# Patient Record
Sex: MALE | Race: WHITE | Employment: FULL TIME | URBAN - METROPOLITAN AREA
[De-identification: names, ages, dates, MRNs, and addresses within clinical notes are randomized per-mention and may not be internally consistent; named-entity substitution may affect disease eponyms.]

---

## 2020-03-02 ENCOUNTER — HOSPITAL ENCOUNTER (OUTPATIENT)
Facility: CLINIC | Age: 55
Setting detail: OBSERVATION
Discharge: HOME | End: 2020-03-03
Attending: FAMILY MEDICINE | Admitting: FAMILY MEDICINE
Payer: COMMERCIAL

## 2020-03-02 ENCOUNTER — APPOINTMENT (OUTPATIENT)
Dept: GENERAL RADIOLOGY | Facility: CLINIC | Age: 55
End: 2020-03-02
Attending: FAMILY MEDICINE
Payer: COMMERCIAL

## 2020-03-02 DIAGNOSIS — R06.02 SHORTNESS OF BREATH: ICD-10-CM

## 2020-03-02 DIAGNOSIS — R07.9 CHEST PAIN, UNSPECIFIED TYPE: ICD-10-CM

## 2020-03-02 LAB
ALBUMIN SERPL-MCNC: 4.5 G/DL (ref 3.4–5)
ALP SERPL-CCNC: 90 U/L (ref 40–150)
ALT SERPL W P-5'-P-CCNC: 56 U/L (ref 0–70)
ANION GAP SERPL CALCULATED.3IONS-SCNC: 7 MMOL/L (ref 3–14)
AST SERPL W P-5'-P-CCNC: 29 U/L (ref 0–45)
BASOPHILS # BLD AUTO: 0 10E9/L (ref 0–0.2)
BASOPHILS NFR BLD AUTO: 0.3 %
BILIRUB SERPL-MCNC: 0.8 MG/DL (ref 0.2–1.3)
BUN SERPL-MCNC: 17 MG/DL (ref 7–30)
CALCIUM SERPL-MCNC: 9.1 MG/DL (ref 8.5–10.1)
CHLORIDE SERPL-SCNC: 103 MMOL/L (ref 94–109)
CO2 SERPL-SCNC: 28 MMOL/L (ref 20–32)
CREAT SERPL-MCNC: 0.92 MG/DL (ref 0.66–1.25)
D DIMER PPP FEU-MCNC: <0.3 UG/ML FEU (ref 0–0.5)
DIFFERENTIAL METHOD BLD: NORMAL
EOSINOPHIL # BLD AUTO: 0.1 10E9/L (ref 0–0.7)
EOSINOPHIL NFR BLD AUTO: 0.8 %
ERYTHROCYTE [DISTWIDTH] IN BLOOD BY AUTOMATED COUNT: 12.6 % (ref 10–15)
GFR SERPL CREATININE-BSD FRML MDRD: >90 ML/MIN/{1.73_M2}
GLUCOSE SERPL-MCNC: 99 MG/DL (ref 70–99)
HCT VFR BLD AUTO: 45.5 % (ref 40–53)
HGB BLD-MCNC: 16 G/DL (ref 13.3–17.7)
IMM GRANULOCYTES # BLD: 0 10E9/L (ref 0–0.4)
IMM GRANULOCYTES NFR BLD: 0.6 %
LYMPHOCYTES # BLD AUTO: 1 10E9/L (ref 0.8–5.3)
LYMPHOCYTES NFR BLD AUTO: 14 %
MCH RBC QN AUTO: 31.2 PG (ref 26.5–33)
MCHC RBC AUTO-ENTMCNC: 35.2 G/DL (ref 31.5–36.5)
MCV RBC AUTO: 89 FL (ref 78–100)
MONOCYTES # BLD AUTO: 0.5 10E9/L (ref 0–1.3)
MONOCYTES NFR BLD AUTO: 6.6 %
NEUTROPHILS # BLD AUTO: 5.6 10E9/L (ref 1.6–8.3)
NEUTROPHILS NFR BLD AUTO: 77.7 %
NRBC # BLD AUTO: 0 10*3/UL
NRBC BLD AUTO-RTO: 0 /100
NT-PROBNP SERPL-MCNC: 13 PG/ML (ref 0–900)
PLATELET # BLD AUTO: 202 10E9/L (ref 150–450)
POTASSIUM SERPL-SCNC: 3.6 MMOL/L (ref 3.4–5.3)
PROT SERPL-MCNC: 8.1 G/DL (ref 6.8–8.8)
RBC # BLD AUTO: 5.13 10E12/L (ref 4.4–5.9)
SODIUM SERPL-SCNC: 138 MMOL/L (ref 133–144)
TROPONIN I SERPL-MCNC: <0.015 UG/L (ref 0–0.04)
WBC # BLD AUTO: 7.2 10E9/L (ref 4–11)

## 2020-03-02 PROCEDURE — 85025 COMPLETE CBC W/AUTO DIFF WBC: CPT | Performed by: FAMILY MEDICINE

## 2020-03-02 PROCEDURE — 99220 ZZC INITIAL OBSERVATION CARE,LEVL III: CPT | Mod: Z6 | Performed by: NURSE PRACTITIONER

## 2020-03-02 PROCEDURE — 80053 COMPREHEN METABOLIC PANEL: CPT | Performed by: FAMILY MEDICINE

## 2020-03-02 PROCEDURE — 85379 FIBRIN DEGRADATION QUANT: CPT | Performed by: FAMILY MEDICINE

## 2020-03-02 PROCEDURE — 93005 ELECTROCARDIOGRAM TRACING: CPT | Mod: 76 | Performed by: FAMILY MEDICINE

## 2020-03-02 PROCEDURE — 84484 ASSAY OF TROPONIN QUANT: CPT | Performed by: FAMILY MEDICINE

## 2020-03-02 PROCEDURE — 83880 ASSAY OF NATRIURETIC PEPTIDE: CPT | Performed by: FAMILY MEDICINE

## 2020-03-02 PROCEDURE — 71045 X-RAY EXAM CHEST 1 VIEW: CPT

## 2020-03-02 PROCEDURE — 93005 ELECTROCARDIOGRAM TRACING: CPT | Performed by: FAMILY MEDICINE

## 2020-03-02 PROCEDURE — 25000132 ZZH RX MED GY IP 250 OP 250 PS 637: Performed by: FAMILY MEDICINE

## 2020-03-02 PROCEDURE — 93010 ELECTROCARDIOGRAM REPORT: CPT | Mod: Z6 | Performed by: FAMILY MEDICINE

## 2020-03-02 PROCEDURE — 99285 EMERGENCY DEPT VISIT HI MDM: CPT | Mod: 25 | Performed by: FAMILY MEDICINE

## 2020-03-02 RX ORDER — ALUMINA, MAGNESIA, AND SIMETHICONE 2400; 2400; 240 MG/30ML; MG/30ML; MG/30ML
30 SUSPENSION ORAL EVERY 4 HOURS PRN
Status: DISCONTINUED | OUTPATIENT
Start: 2020-03-02 | End: 2020-03-03 | Stop reason: HOSPADM

## 2020-03-02 RX ORDER — MULTIVIT-MIN/IRON/FOLIC ACID/K 18-600-40
1 CAPSULE ORAL DAILY
COMMUNITY

## 2020-03-02 RX ORDER — LISINOPRIL 10 MG/1
10 TABLET ORAL DAILY
COMMUNITY

## 2020-03-02 RX ORDER — NITROGLYCERIN 0.4 MG/1
0.4 TABLET SUBLINGUAL EVERY 5 MIN PRN
Status: DISCONTINUED | OUTPATIENT
Start: 2020-03-02 | End: 2020-03-03 | Stop reason: HOSPADM

## 2020-03-02 RX ORDER — ASPIRIN 81 MG/1
81 TABLET, CHEWABLE ORAL DAILY
Status: DISCONTINUED | OUTPATIENT
Start: 2020-03-03 | End: 2020-03-03 | Stop reason: HOSPADM

## 2020-03-02 RX ORDER — ACETAMINOPHEN 650 MG/1
650 SUPPOSITORY RECTAL EVERY 4 HOURS PRN
Status: DISCONTINUED | OUTPATIENT
Start: 2020-03-02 | End: 2020-03-03 | Stop reason: HOSPADM

## 2020-03-02 RX ORDER — ACETAMINOPHEN 325 MG/1
650 TABLET ORAL EVERY 4 HOURS PRN
Status: DISCONTINUED | OUTPATIENT
Start: 2020-03-02 | End: 2020-03-03 | Stop reason: HOSPADM

## 2020-03-02 RX ORDER — NALOXONE HYDROCHLORIDE 0.4 MG/ML
.1-.4 INJECTION, SOLUTION INTRAMUSCULAR; INTRAVENOUS; SUBCUTANEOUS
Status: DISCONTINUED | OUTPATIENT
Start: 2020-03-02 | End: 2020-03-03 | Stop reason: HOSPADM

## 2020-03-02 RX ORDER — ASPIRIN 81 MG/1
324 TABLET, CHEWABLE ORAL ONCE
Status: COMPLETED | OUTPATIENT
Start: 2020-03-02 | End: 2020-03-02

## 2020-03-02 RX ORDER — CHLORTHALIDONE 25 MG/1
25 TABLET ORAL DAILY
COMMUNITY

## 2020-03-02 RX ORDER — ASPIRIN 81 MG/1
162 TABLET, CHEWABLE ORAL ONCE
Status: DISCONTINUED | OUTPATIENT
Start: 2020-03-03 | End: 2020-03-03 | Stop reason: HOSPADM

## 2020-03-02 RX ORDER — FEXOFENADINE HCL 180 MG/1
180 TABLET ORAL DAILY PRN
COMMUNITY

## 2020-03-02 RX ORDER — LIDOCAINE 40 MG/G
CREAM TOPICAL
Status: DISCONTINUED | OUTPATIENT
Start: 2020-03-02 | End: 2020-03-03 | Stop reason: HOSPADM

## 2020-03-02 RX ADMIN — ASPIRIN 81 MG CHEWABLE TABLET 324 MG: 81 TABLET CHEWABLE at 20:58

## 2020-03-03 ENCOUNTER — APPOINTMENT (OUTPATIENT)
Dept: CARDIOLOGY | Facility: CLINIC | Age: 55
End: 2020-03-03
Attending: NURSE PRACTITIONER
Payer: COMMERCIAL

## 2020-03-03 VITALS
TEMPERATURE: 97.8 F | RESPIRATION RATE: 16 BRPM | WEIGHT: 196 LBS | DIASTOLIC BLOOD PRESSURE: 88 MMHG | HEART RATE: 68 BPM | OXYGEN SATURATION: 98 % | SYSTOLIC BLOOD PRESSURE: 144 MMHG

## 2020-03-03 LAB
INTERPRETATION ECG - MUSE: NORMAL
TROPONIN I SERPL-MCNC: <0.015 UG/L (ref 0–0.04)
TROPONIN I SERPL-MCNC: <0.015 UG/L (ref 0–0.04)

## 2020-03-03 PROCEDURE — 93017 CV STRESS TEST TRACING ONLY: CPT

## 2020-03-03 PROCEDURE — 93010 ELECTROCARDIOGRAM REPORT: CPT | Performed by: INTERNAL MEDICINE

## 2020-03-03 PROCEDURE — 99217 ZZC OBSERVATION CARE DISCHARGE: CPT | Mod: Z6 | Performed by: FAMILY MEDICINE

## 2020-03-03 PROCEDURE — 93321 DOPPLER ECHO F-UP/LMTD STD: CPT | Mod: 26 | Performed by: INTERNAL MEDICINE

## 2020-03-03 PROCEDURE — G0378 HOSPITAL OBSERVATION PER HR: HCPCS

## 2020-03-03 PROCEDURE — 36415 COLL VENOUS BLD VENIPUNCTURE: CPT | Performed by: NURSE PRACTITIONER

## 2020-03-03 PROCEDURE — 93350 STRESS TTE ONLY: CPT | Mod: 26 | Performed by: INTERNAL MEDICINE

## 2020-03-03 PROCEDURE — 25000132 ZZH RX MED GY IP 250 OP 250 PS 637: Performed by: NURSE PRACTITIONER

## 2020-03-03 PROCEDURE — 93018 CV STRESS TEST I&R ONLY: CPT | Mod: GC | Performed by: INTERNAL MEDICINE

## 2020-03-03 PROCEDURE — 84484 ASSAY OF TROPONIN QUANT: CPT | Mod: 91 | Performed by: NURSE PRACTITIONER

## 2020-03-03 PROCEDURE — 25500064 ZZH RX 255 OP 636: Performed by: INTERNAL MEDICINE

## 2020-03-03 PROCEDURE — 40000065 ZZH STATISTIC EKG NON-CHARGEABLE

## 2020-03-03 PROCEDURE — 93325 DOPPLER ECHO COLOR FLOW MAPG: CPT | Mod: 26 | Performed by: INTERNAL MEDICINE

## 2020-03-03 PROCEDURE — 93005 ELECTROCARDIOGRAM TRACING: CPT

## 2020-03-03 PROCEDURE — 93016 CV STRESS TEST SUPVJ ONLY: CPT | Mod: GC | Performed by: INTERNAL MEDICINE

## 2020-03-03 RX ORDER — HYDRALAZINE HYDROCHLORIDE 20 MG/ML
5 INJECTION INTRAMUSCULAR; INTRAVENOUS EVERY 4 HOURS PRN
Status: DISCONTINUED | OUTPATIENT
Start: 2020-03-03 | End: 2020-03-03 | Stop reason: HOSPADM

## 2020-03-03 RX ORDER — LISINOPRIL 10 MG/1
10 TABLET ORAL DAILY
Status: DISCONTINUED | OUTPATIENT
Start: 2020-03-03 | End: 2020-03-03 | Stop reason: HOSPADM

## 2020-03-03 RX ADMIN — PERFLUTREN 5 ML: 6.52 INJECTION, SUSPENSION INTRAVENOUS at 10:32

## 2020-03-03 RX ADMIN — ASPIRIN 81 MG CHEWABLE TABLET 81 MG: 81 TABLET CHEWABLE at 08:08

## 2020-03-03 RX ADMIN — LISINOPRIL 10 MG: 10 TABLET ORAL at 08:08

## 2020-03-03 NOTE — H&P
"[facility], [place]    History and Physical - ED Observation       Date of Admission:  3/2/2020    Assessment & Plan   Khanh Farah is a 55 year old male with a past medical history of hypertension who presents to the Emergency Department for evaluation of chest pain, and shortness of breath.     #Chest pain  #Shortness of breath  Patient states he initially felt this chest pain yesterday when he was walking around the grocery store.  Patient states this resolved on its own, and went to the rest of the day without noticing anything abnormal.  Patient states he walked 3-4 blocks, and then sat down on the bus today when he noticed a \"sharp\" chest pain in the left side of his chest, with associated shortness of breath and diaphoresis. In the ED, /100, HR 67, temp 96.5, RR 16, SPO2 98% on RA. Labs show Na 138, K+ 3.6, Cr 0.92, BUN 17. BNP 13. Troponin negative. D-Dimer 0.3. CBC unremarkable. EKG stable. Chest xray shows no acute cardiopulmonary process. Cardiac risk factors include HTN, HLD, and family history. Medications: ASA 162mg in ED. Plan: Admit to ED Observation for ACS Rule out.   -Mullens to ED Obs  -Continuous cardiac monitoring  -Serial troponins  -Exercise stress echo   -Hydralazine PRN  -Continue PTA lisinopril     Diet: NPO for Medical/Clinical Reasons Except for: Meds    DVT Prophylaxis: Low Risk/Ambulatory with no VTE prophylaxis indicated  Butcher Catheter: not present  Code Status: full    Disposition Plan   Expected discharge: Tomorrow, recommended to prior living arrangement once cardiac workup complete.  Entered: Annette Isaac CNP 03/02/2020, 10:47 PM       Annette Isaac CNP  [facility], [place]  ED Observation, 6D  [id number]:[id number]  ______________________________________________________________________    Chief Complaint   Chest pain    History is obtained from the patient    History of Present Illness   Per ED,\"Khanh Farah is " "a 55 year old male with a past medical history of hypertension who presents to the Emergency Department for evaluation of chest pain, and shortness of breath.  Patient states he initially felt this chest pain yesterday when he was walking around the grocery store.  Patient states this resolved on its own, and went to the rest of the day without noticing anything abnormal.  Patient states he walked 3-4 blocks, and then sat down on the bus today when he noticed a \"sharp\" chest pain in the left side of his chest, with associated shortness of breath.  He states this was way worse than the episode he had yesterday.  Patient states he \"felt a little off\" prior to this chest pain and shortness of breath.  Patient states he feels he is unable to get a full breath at this time.  Patient currently denies having this particular pain, but states that it comes and goes.  Patient states that these pains usually last 15 minutes.  Patient states that he has never experienced chest pain like this before, and that his blood pressure is usually not in the 160s systolic.  Patient denies any history of diabetes, tobacco use, or hyperlipidemia.  Patient denies any history of heart problems.  Patient states he feels \"tense\".  Patient denies any cancer history, or leg swelling at this time.  Patient states his father did have a aortic stenosis.\"    Review of Systems    ROS: 14 point ROS neg other than the symptoms noted above in the HPI.    Past Medical History    I have reviewed this patient's medical history and updated it with pertinent information if needed.   History reviewed. No pertinent past medical history.    Past Surgical History   I have reviewed this patient's surgical history and updated it with pertinent information if needed.  History reviewed. No pertinent surgical history.    Social History   I have reviewed this patient's social history and updated it with pertinent information if needed.  Social History     Tobacco Use "     Smoking status: None   Substance Use Topics     Alcohol use: None     Drug use: None       Family History   I have reviewed this patient's family history and updated it with pertinent information if needed.   No family history on file.    Prior to Admission Medications   Prior to Admission Medications   Prescriptions Last Dose Informant Patient Reported? Taking?   Vitamin D, Cholecalciferol, 25 MCG (1000 UT) TABS   Yes Yes   Sig: Take 1 tablet by mouth daily   chlorthalidone (HYGROTON) 25 MG tablet   Yes Yes   Sig: Take 25 mg by mouth daily   fexofenadine (ALLEGRA) 180 MG tablet   Yes Yes   Sig: Take 180 mg by mouth daily as needed for allergies   lisinopril (ZESTRIL) 10 MG tablet   Yes Yes   Sig: Take 10 mg by mouth daily      Facility-Administered Medications: None     Allergies   No Known Allergies    Physical Exam   Vital Signs: Temp: 96.5  F (35.8  C) Temp src: Oral BP: (!) 136/97 Pulse: 67 Heart Rate: 59 Resp: 8 SpO2: 98 % O2 Device: None (Room air)    Weight: 196 lbs 3 oz    Constitutional: awake, alert, cooperative, no apparent distress, and appears stated age  Eyes: Lids and lashes normal, pupils equal, round and reactive to light, extra ocular muscles intact, sclera clear, conjunctiva normal  ENT: Normocephalic, atraumatic, external ears without lesions, oral pharynx with moist mucous membranes.  Respiratory: Clear to auscultation bilaterally, no crackles or wheezing  Cardiovascular: Regular rate and rhythm, normal S1 and S2, no S3 or S4, and no murmur noted  Abdomen: Normal bowel sounds, soft, non-distended  Skin: normal skin color, texture, turgor  Musculoskeletal: There is no redness, warmth, or swelling of the joints.  Full range of motion noted.  Motor strength is 5 out of 5 all extremities bilaterally.  Tone is normal.  Neurologic: Awake, alert, oriented to name, place and time.  Cranial nerves II-XII are grossly intact.  Motor is 5 out of 5 bilaterally. Gait is normal.    Data   EKG  Interpreted  by Francois Hancock MD  Time reviewed: 2040  Symptoms at time of EKG: chets pain and dyspnea   Rhythm: normal sinus   Rate: normal  Axis: normal  Ectopy: none  Conduction: normal  ST Segments/ T Waves: No ST-T wave changes  Q Waves: none  Comparison to prior: No old EKG available     Clinical Impression: normal EKG    Recent Labs   Lab 03/02/20 2055   WBC 7.2   HGB 16.0   MCV 89         POTASSIUM 3.6   CHLORIDE 103   CO2 28   BUN 17   CR 0.92   ANIONGAP 7   BRIDGETT 9.1   GLC 99   ALBUMIN 4.5   PROTTOTAL 8.1   BILITOTAL 0.8   ALKPHOS 90   ALT 56   AST 29   TROPI <0.015     Recent Results (from the past 24 hour(s))   XR Chest Port 1 View    Narrative    EXAM: XR CHEST PORT 1 VW  LOCATION: [facility]  DATE/TIME: 3/2/2020 8:58 PM    INDICATION: Chest pain and dyspnea  COMPARISON: None.      Impression    IMPRESSION: Negative chest.

## 2020-03-03 NOTE — PLAN OF CARE
Observation Goals        - Serial troponins and stress test complete: In progress, stress test this am  - Seen and cleared by consultant if applicable: Not met  - Adequate pain control on oral analgesia: In progress   - Vital signs normal or at patient baseline: Not met, BP elevated  - Safe disposition plan has been identified: Not met

## 2020-03-03 NOTE — PLAN OF CARE
Observation Goals    - Serial troponins and stress test complete: In progress, stress test this am.  - Seen and cleared by consultant if applicable: No.  - Adequate pain control on oral analgesia: Patient denies pain at this time.  - Vital signs normal or at patient baseline: Met, BP normal.  - Safe disposition plan has been identified: Pending test results.

## 2020-03-03 NOTE — ED NOTES
Observation Brochure and Video    Patient and family informed of observation status based on provider's order.  Observation brochure was given and the video watched. Patient/Family stated understanding. Questions answered.    Mukesh Arguello RN

## 2020-03-03 NOTE — PLAN OF CARE
Observation Goals          - Serial troponins and stress test complete: In progress, stress test this am  - Seen and cleared by consultant if applicable: Not met  - Adequate pain control on oral analgesia: In progress   - Vital signs normal or at patient baseline: Met, BP normal  - Safe disposition plan has been identified: Not met    /80 (BP Location: Left arm)   Pulse 68   Temp 97.9  F (36.6  C) (Oral)   Resp 15   Wt 88.9 kg (196 lb)   SpO2 97%

## 2020-03-03 NOTE — DISCHARGE SUMMARY
ED Observation Discharge Summary    Khanh Farah   MRN# [id number]  Age: 55 year old   YOB: 1965            Date of Admission: 03/2/2020    Date of Discharge: 03/3/2020  Admitting Physician: Dr. Francois Hancock MD  Discharge Physician: Dr. Santi MD  NP/PA: Geraldine Werner CNP    DISCHARGE DIAGNOSIS:   ##Chest Pain  ##SOB     INTERVAL HISTORY: VSS, afebrile. Up ad marquez. Feeling improved.     PHYSICAL EXAM:   Blood pressure (!) 144/88, pulse 68, temperature 97.8  F (36.6  C), temperature source Oral, resp. rate 16, weight 88.9 kg (196 lb), SpO2 98 %.   GENERAL APPEARENCE: , A/O x4. NAD.  SKIN: Clean, dry, and intact without visible lesions, rash, jaundice, cyanosis, erythema, ecchymoses to exposed areas. No hair or nail changes.  HEENT/NECK: NCAT w/out masses, lesions, or abnormalities. Sclera anicteric, PERRLA, EOMI.  Oral mucosa pink and moist without erythema, exudate, lesions, ulcerations, or thrush. Teeth and gums normal. Neck supple, no masses. No jugular venous distention.   CARDIOVASCULAR: S1, S2 RRR. No murmurs, rubs, or gallops.   RESPIRATORY: Respiratory effort WNL. CTA  bilaterally without crackles/rales/wheeze   GI: Active BS in all 4 quadrants. Abdomen soft and non-tender. No masses or hepatosplenomegaly.  : Deferred  MUSCULOSKELETAL:  Extremities normal, no gross deformities noted, non-tender to palpation.   PV: 2+ bilateral radial and pedal pulses. No edema noted.   NEURO: CN II-XII grossly intact. Speech normal. Appropriate throughout interview.   HEME/LYMPH: No visible bleeding.   PSYCHIATRIC: Mentation and affect appear normal  VASCULAR ACCESS: CDI without erythema or discharge. Non-tender.    PROCEDURES AND IMAGING:   Results for orders placed or performed during the hospital encounter of 03/02/20 (from the past 24 hour(s))   EKG 12 lead   Result Value Ref Range    Interpretation ECG Click View Image link to view waveform and result    CBC with platelets differential   Result  Value Ref Range    WBC 7.2 4.0 - 11.0 10e9/L    RBC Count 5.13 4.4 - 5.9 10e12/L    Hemoglobin 16.0 13.3 - 17.7 g/dL    Hematocrit 45.5 40.0 - 53.0 %    MCV 89 78 - 100 fl    MCH 31.2 26.5 - 33.0 pg    MCHC 35.2 31.5 - 36.5 g/dL    RDW 12.6 10.0 - 15.0 %    Platelet Count 202 150 - 450 10e9/L    Diff Method Automated Method     % Neutrophils 77.7 %    % Lymphocytes 14.0 %    % Monocytes 6.6 %    % Eosinophils 0.8 %    % Basophils 0.3 %    % Immature Granulocytes 0.6 %    Nucleated RBCs 0 0 /100    Absolute Neutrophil 5.6 1.6 - 8.3 10e9/L    Absolute Lymphocytes 1.0 0.8 - 5.3 10e9/L    Absolute Monocytes 0.5 0.0 - 1.3 10e9/L    Absolute Eosinophils 0.1 0.0 - 0.7 10e9/L    Absolute Basophils 0.0 0.0 - 0.2 10e9/L    Abs Immature Granulocytes 0.0 0 - 0.4 10e9/L    Absolute Nucleated RBC 0.0    Troponin I   Result Value Ref Range    Troponin I ES <0.015 0.000 - 0.045 ug/L   Comprehensive metabolic panel   Result Value Ref Range    Sodium 138 133 - 144 mmol/L    Potassium 3.6 3.4 - 5.3 mmol/L    Chloride 103 94 - 109 mmol/L    Carbon Dioxide 28 20 - 32 mmol/L    Anion Gap 7 3 - 14 mmol/L    Glucose 99 70 - 99 mg/dL    Urea Nitrogen 17 7 - 30 mg/dL    Creatinine 0.92 0.66 - 1.25 mg/dL    GFR Estimate >90 >60 mL/min/[1.73_m2]    GFR Estimate If Black >90 >60 mL/min/[1.73_m2]    Calcium 9.1 8.5 - 10.1 mg/dL    Bilirubin Total 0.8 0.2 - 1.3 mg/dL    Albumin 4.5 3.4 - 5.0 g/dL    Protein Total 8.1 6.8 - 8.8 g/dL    Alkaline Phosphatase 90 40 - 150 U/L    ALT 56 0 - 70 U/L    AST 29 0 - 45 U/L   D dimer quantitative   Result Value Ref Range    D Dimer <0.3 0.0 - 0.50 ug/ml FEU   Nt probnp inpatient (BNP)   Result Value Ref Range    N-Terminal Pro BNP Inpatient 13 0 - 900 pg/mL   XR Chest Port 1 View    Narrative    EXAM: XR CHEST PORT 1 VW  LOCATION: [facility]  DATE/TIME: 3/2/2020 8:58 PM    INDICATION: Chest pain and dyspnea  COMPARISON: None.      Impression    IMPRESSION: Negative chest.   EKG 12-lead, tracing  only   Result Value Ref Range    Interpretation ECG Click View Image link to view waveform and result    Troponin I   Result Value Ref Range    Troponin I ES <0.015 0.000 - 0.045 ug/L   EKG 12-lead, complete   Result Value Ref Range    Interpretation ECG Click View Image link to view waveform and result    Troponin I   Result Value Ref Range    Troponin I ES <0.015 0.000 - 0.045 ug/L   Echo Stress Echocardiogram    Narrative    [id number]  [id number]  [id number]  [id number]^RONIT^SHAYNE^KEVIN           [facility]  Echocardiography Laboratory  [address] [place] [id number]  Name: MICH PIERCE  MRN: [id number]  : 1965  Study Date: 2020 10:32 AM  Age: 55 yrs  Gender: Male  Patient Location: [facility]  Reason For Study: Chest Pain  Ordering Physician: SHAYNE COTTRELL  Performed By: Katia Arguello RDCS     BSA: 2.1 m2  Height: 72 in  Weight: 196 lb  HR: 66  BP: 128/85 mmHg  _____________________________________________________________________________  __        Procedure  Stress Echo Bike with two dimensional, color and spectral Doppler performed.  Contrast Definity.  _____________________________________________________________________________  __        Interpretation Summary  Negative, low risk stress echocardiogram at diagnostic level of peak stress  (95% MAPHR).     No subjective evidence of ischemia at rest or at peak exercise.  Normal blood pressure and heart rate response to exercise.  No ECG evidence of ischemia at rest or at peak exercise.  At rest, LVEF estimated at 55-60%, no regional wall motion abnormalities, and  normal LV size.  At peak stress, LVEF increased appropriately to 70-75% without regional wall  motion abnormalities.  Less than average functional capacity.     No significant valvular disease and normal sized aorta on routine screening  echocardiogram.  _____________________________________________________________________________  __      Stress  Definity (NDC #74065-788-38) given intravenously.  Patient was given 5ml mixture of 1.5ml Definity and 8.5ml saline.  5 ml wasted.  Definity Expiration 01/01/2021 .  Definity Lot # 4734 .  Peak MVO2 17 ml/kg/min .  Percent predicted MVO2 61 %.  RPP 99717.  Maximum workload 100 waller.  Target Heart Rate was achieved.  Exercise was stopped due to leg fatigue.  The patient exhibited dyspnea during exercise.     Rest  Sinus rhythm, no pathologic Q waves or resting ST segment abnormalities.  Limiting Symptom (s) : fatigue.  Angina present? No.     Stress Results                                       Maximum Predicted HR:   167 bpm             Target HR: 142 bpm        % Maximum Predicted HR: 95 %                             Stage DurationHeart Rate   BP                                 (mm:ss)   (bpm)                        BASELINE            66     128/85                          PEAK    4:49      159   184/108                             Stress Duration:   4:49 mm:ss                       Maximum Stress HR: 159 bpm     Left Ventricle  Left ventricular systolic function is normal. The left ventricle is normal in  size. There is mild concentric left ventricular hypertrophy. No regional wall  motion abnormalities noted.     Aortic Valve  The aortic valve is normal in structure and function. The aortic valve is  trileaflet.     Mitral Valve  The mitral valve leaflets appear normal. There is no evidence of stenosis,  fluttering, or prolapse.        Atria  The left atrial size is normal. Right atrial size is normal.     Tricuspid Valve  The tricuspid valve is normal.     Right Ventricle  The right ventricular systolic function is normal. The right ventricle is  grossly normal size. There is normal right ventricular wall thickness.     Vessels  Normal aorta.     Pericardium  There is no pericardial effusion.        Attestation  I was present and supervised the stress test. I personally viewed the imaging  and agree  "with the interpretation and report as documented by the fellow,  Alejandro Boo.  _____________________________________________________________________________  __                                Report approved by: Luis Patiño 03/03/2020 11:36 AM                    _____________________________________________________________________________  __        DISCHARGE MEDICATIONS:   Current Discharge Medication List      CONTINUE these medications which have NOT CHANGED    Details   chlorthalidone (HYGROTON) 25 MG tablet Take 25 mg by mouth daily      fexofenadine (ALLEGRA) 180 MG tablet Take 180 mg by mouth daily as needed for allergies      lisinopril (ZESTRIL) 10 MG tablet Take 10 mg by mouth daily      Vitamin D, Cholecalciferol, 25 MCG (1000 UT) TABS Take 1 tablet by mouth daily               CONSULTATIONS:   Consultation during this admission received from:  N/A    BRIEF HISTORY OF PRESENT ILLNESS:   (Adopted from admission H&P).    \"Khanh Farah is a 55 year old male with a past medical history of hypertension who presents to the Emergency Department for evaluation of chest pain, and shortness of breath.  Patient states he initially felt this chest pain yesterday when he was walking around the grocery store.  Patient states this resolved on its own, and went to the rest of the day without noticing anything abnormal.  Patient states he walked 3-4 blocks, and then sat down on the bus today when he noticed a \"sharp\" chest pain in the left side of his chest, with associated shortness of breath.  He states this was way worse than the episode he had yesterday.  Patient states he \"felt a little off\" prior to this chest pain and shortness of breath.  Patient states he feels he is unable to get a full breath at this time.  Patient currently denies having this particular pain, but states that it comes and goes.  Patient states that these pains usually last 15 minutes.  Patient states that he has never experienced " "chest pain like this before, and that his blood pressure is usually not in the 160s systolic.  Patient denies any history of diabetes, tobacco use, or hyperlipidemia.  Patient denies any history of heart problems.  Patient states he feels \"tense\".  Patient denies any cancer history, or leg swelling at this time.  Patient states his father did have a aortic stenosis.\"    ED OBSERVATION COURSE: Khanh Farah is a 55 year old male with a past medical history of hypertension who presents to the Emergency Department for evaluation of chest pain, and shortness of breath.      #Chest pain  #Shortness of breath  Patient states he initially felt this chest pain yesterday when he was walking around the grocery store.  Patient states this resolved on its own, and went to the rest of the day without noticing anything abnormal.  Patient states he walked 3-4 blocks, and then sat down on the bus today when he noticed a \"sharp\" chest pain in the left side of his chest, with associated shortness of breath and diaphoresis. In the ED, /100, HR 67, temp 96.5, RR 16, SPO2 98% on RA. Labs show Na 138, K+ 3.6, Cr 0.92, BUN 17. BNP 13. Troponin negative. D-Dimer 0.3. CBC unremarkable. EKG stable. Chest xray shows no acute cardiopulmonary process. Cardiac risk factors include HTN, HLD, and family history. Medications: ASA 162mg in ED. Plan: Admit to ED Observation for ACS Rule out.    While in observation serial troponins were negative x 3. He underwent an exercise stress echo, which was negative. He will continue his home lisinopril.     DISCHARGE DISPOSITION:   Discharged to home. The patient was discharged in a stable condition and agreed to discharge plan.    DISCHARGE INSTRUCTIONS AND FOLLOW-UP:  Discharge Procedure Orders   Reason for your hospital stay   Order Comments: You were admitted to the observation unit for evaluation of your chest pain.  Your EKG was normal, labs checking for heart damage were negative, chest " x-ray was normal.  You underwent a stress test and this was normal.     Adult [facility] Follow-up and recommended labs and tests   Order Comments: Follow up with primary care provider, Physician No Ref-Primary, within 7 days for hospital follow- up.        Appointments on [place] and/or [facility] (with [facility] or [facility] provider or service). Call [phone] if you haven't heard regarding these appointments within 7 days of discharge.     Activity   Order Comments: Your activity upon discharge: activity as tolerated and no driving for today     Order Specific Question Answer Comments   Is discharge order? Yes      When to contact your care team   Order Comments: Return to the ED with fever, uncontrolled nausea, vomiting, unrelieved pain, bleeding not relieved with pressure, dizziness, chest pain, shortness of breath, loss of consciousness, and any new or concerning symptoms.     Return to the ER if you have new or worsening chest pain, shortness of breath, jaw or arm pain, or fainting.     Diet   Order Comments: Follow this diet upon discharge: Regular diet     Order Specific Question Answer Comments   Is discharge order? Yes       Attestation:   I have reviewed today's vital signs, notes, medications, labs and imaging.      JUAN Ibarra, CNP  Nurse Practitioner   Emergency Department Observation Unit

## 2020-03-03 NOTE — ED NOTES
[facility], [place]   ED Nurse to Floor Handoff     Khanh Farah is a 55 year old male who speaks English and lives with family members,  in a home  They arrived in the ED by car from home    ED Chief Complaint: Chest Pain (Onset 2 hours ago with intermittent midsternal chest pain and unable to take a deep breath, EMS called to house, told to come here to evaluate.)    ED Dx;   Final diagnoses:   Chest pain, unspecified type         Needed?: No    Allergies: No Known Allergies.  Past Medical Hx: No past medical history on file.   Baseline Mental status: WDL  Current Mental Status changes: at basesline    Infection present or suspected this encounter: no  Sepsis suspected: No  Isolation type: No active isolations     Activity level - Baseline/Home:  Independent  Activity Level - Current:   Independent    Bariatric equipment needed?: No    In the ED these meds were given:   Medications   aspirin (ASA) chewable tablet 324 mg (324 mg Oral Given 3/2/20 2058)       Drips running?  No    Home pump  No    Current LDAs  Peripheral IV 03/02/20 Right Upper forearm (Active)   Site Assessment WDL 3/2/2020  8:58 PM   Line Status Saline locked 3/2/2020  8:58 PM   Phlebitis Scale 0-->no symptoms 3/2/2020  8:58 PM   Infiltration Scale 0 3/2/2020  8:58 PM   Number of days: 0       Labs results:   Labs Ordered and Resulted from Time of ED Arrival Up to the Time of Departure from the ED   CBC WITH PLATELETS DIFFERENTIAL   TROPONIN I   COMPREHENSIVE METABOLIC PANEL   D DIMER QUANTITATIVE   NT PROBNP INPATIENT   PULSE OXIMETRY NURSING   CARDIAC CONTINUOUS MONITORING   PERIPHERAL IV CATHETER   PATIENT CARE ORDER       Imaging Studies:   Recent Results (from the past 24 hour(s))   XR Chest Port 1 View    Narrative    EXAM: XR CHEST PORT 1 VW  LOCATION: [facility]  DATE/TIME: 3/2/2020 8:58 PM    INDICATION: Chest pain and dyspnea  COMPARISON: None.      Impression    IMPRESSION:  Negative chest.       Recent vital signs:   BP (!) 162/100   Pulse 67   Temp 96.5  F (35.8  C) (Oral)   Resp 16   Wt 89 kg (196 lb 3 oz)   SpO2 98%             Cardiac Rhythm: Normal Sinus  Pt needs tele? Yes  Skin/wound Issues: None    Code Status: Full Code    Pain control: pt had none    Nausea control: pt had none    Abnormal labs/tests/findings requiring intervention:     Family present during ED course? Yes   Family Comments/Social Situation comments:     Tasks needing completion: None    Mukesh Arguello, RN  [phone] [facility]

## 2020-03-03 NOTE — ED PROVIDER NOTES
"    [facility] EMERGENCY DEPARTMENT ([facility])    3/02/20      History     Chief Complaint   Patient presents with     Chest Pain     Onset 2 hours ago with intermittent midsternal chest pain and unable to take a deep breath, EMS called to house, told to come here to evaluate.     The history is provided by the patient and medical records.     Khanh Farah is a 55 year old male with a past medical history of hypertension who presents to the Emergency Department for evaluation of chest pain, and shortness of breath.  Patient states he initially felt this chest pain yesterday when he was walking around the grocery store.  Patient states this resolved on its own, and went to the rest of the day without noticing anything abnormal.  Patient states he walked 3-4 blocks, and then sat down on the bus today when he noticed a \"sharp\" chest pain in the left side of his chest, with associated shortness of breath.  He states this was way worse than the episode he had yesterday.  Patient states he \"felt a little off\" prior to this chest pain and shortness of breath.  Patient states he feels he is unable to get a full breath at this time.  Patient currently denies having this particular pain, but states that it comes and goes.  Patient states that these pains usually last 15 minutes.  Patient states that he has never experienced chest pain like this before, and that his blood pressure is usually not in the 160s systolic.  Patient denies any history of diabetes, tobacco use, or hyperlipidemia.  Patient denies any history of heart problems.  Patient states he feels \"tense\".  Patient denies any cancer history, or leg swelling at this time.  Patient states his father did have a aortic stenosis.    No past medical history on file.    No past surgical history on file.    No family history on file.    Social History     Tobacco Use     Smoking status: Not on file   Substance Use Topics     Alcohol use: Not on file       No " current facility-administered medications for this encounter.      No current outpatient medications on file.      No Known Allergies    I have reviewed the Medications, Allergies, Past Medical and Surgical History, and Social History in the Epic system.    Review of Systems  ROS: 14 point ROS neg other than the symptoms noted above in the HPI.  Physical Exam   BP: (!) 162/100  Pulse: 67  Heart Rate: 67  Temp: 96.5  F (35.8  C)  Resp: 16  Weight: 89 kg (196 lb 3 oz)  SpO2: 98 %      Physical Exam  Constitutional:       General: He is not in acute distress.     Appearance: He is not diaphoretic.   HENT:      Head: Atraumatic.   Eyes:      General: No scleral icterus.     Pupils: Pupils are equal, round, and reactive to light.   Cardiovascular:      Rate and Rhythm: Normal rate and regular rhythm.      Heart sounds: Normal heart sounds. No systolic murmur.   Pulmonary:      Effort: No respiratory distress.      Breath sounds: Normal breath sounds.   Abdominal:      General: Bowel sounds are normal.      Palpations: Abdomen is soft.      Tenderness: There is no abdominal tenderness.   Musculoskeletal:         General: No tenderness.   Skin:     General: Skin is warm.      Findings: No rash.   Neurological:      General: No focal deficit present.         ED Course   8:50 PM  The patient was seen and examined by Itz Hancock MD in Room ED19.    Medications   aspirin (ASA) chewable tablet 324 mg (324 mg Oral Given 3/2/20 2058)           Procedures             EKG Interpretation:      Interpreted by Francois Hancock MD  Time reviewed: 2040  Symptoms at time of EKG: chets pain and dyspnea   Rhythm: normal sinus   Rate: normal  Axis: normal  Ectopy: none  Conduction: normal  ST Segments/ T Waves: No ST-T wave changes  Q Waves: none  Comparison to prior: No old EKG available    Clinical Impression: normal EKG               HEART Score  Background  Calculates the overall risk of adverse event in patient's presenting with chest  pain.  Based on 5 criteria (each assigned 0-2 points) including suspiciousness of history, EKG, age, risk factors and troponin.    Data  55 year old male  does not have a problem list on file.   has no history on file for tobacco.  family history is not on file.  Lab Results   Component Value Date    TROPI <0.015 03/02/2020     Criteria   0-2 points for each of 5 items (maximum of 10 points):  Score 1- History moderately suspicious for coronary syndrome  Score 0- EKG Normal  Score 1- Age 45 to 65 years old  Score 2- Three or more risk factors for or history of atherosclerotic disease  Score 0- Within normal limits for troponin levels  Interpretation  Risk of adverse outcome  Heart Score: 4  Total Score 4-6- Adverse Outcome Risk 20.3% - Supports admission with standard rule-out management -serial troponins and stress testing         EKG Interpretation:      Interpreted by Francois Hancock MD  Time reviewed:2205   Symptoms at time of EKG: Repeat EKG  Rhythm: Normal sinus   Rate: Normal  Axis: Normal  Ectopy: None  Conduction: Normal  ST Segments/ T Waves: No ST-T wave changes and No acute ischemic changes  Q Waves: None  Comparison to prior: Unchanged    Clinical Impression: normal EKG               Labs Ordered and Resulted from Time of ED Arrival Up to the Time of Departure from the ED   CBC WITH PLATELETS DIFFERENTIAL   TROPONIN I   COMPREHENSIVE METABOLIC PANEL   D DIMER QUANTITATIVE   NT PROBNP INPATIENT   PULSE OXIMETRY NURSING   CARDIAC CONTINUOUS MONITORING   PERIPHERAL IV CATHETER   PATIENT CARE ORDER     Results for orders placed or performed during the hospital encounter of 03/02/20 (from the past 24 hour(s))   EKG 12 lead   Result Value Ref Range    Interpretation ECG Click View Image link to view waveform and result    CBC with platelets differential   Result Value Ref Range    WBC 7.2 4.0 - 11.0 10e9/L    RBC Count 5.13 4.4 - 5.9 10e12/L    Hemoglobin 16.0 13.3 - 17.7 g/dL    Hematocrit 45.5 40.0 - 53.0 %     MCV 89 78 - 100 fl    MCH 31.2 26.5 - 33.0 pg    MCHC 35.2 31.5 - 36.5 g/dL    RDW 12.6 10.0 - 15.0 %    Platelet Count 202 150 - 450 10e9/L    Diff Method Automated Method     % Neutrophils 77.7 %    % Lymphocytes 14.0 %    % Monocytes 6.6 %    % Eosinophils 0.8 %    % Basophils 0.3 %    % Immature Granulocytes 0.6 %    Nucleated RBCs 0 0 /100    Absolute Neutrophil 5.6 1.6 - 8.3 10e9/L    Absolute Lymphocytes 1.0 0.8 - 5.3 10e9/L    Absolute Monocytes 0.5 0.0 - 1.3 10e9/L    Absolute Eosinophils 0.1 0.0 - 0.7 10e9/L    Absolute Basophils 0.0 0.0 - 0.2 10e9/L    Abs Immature Granulocytes 0.0 0 - 0.4 10e9/L    Absolute Nucleated RBC 0.0    Troponin I   Result Value Ref Range    Troponin I ES <0.015 0.000 - 0.045 ug/L   Comprehensive metabolic panel   Result Value Ref Range    Sodium 138 133 - 144 mmol/L    Potassium 3.6 3.4 - 5.3 mmol/L    Chloride 103 94 - 109 mmol/L    Carbon Dioxide 28 20 - 32 mmol/L    Anion Gap 7 3 - 14 mmol/L    Glucose 99 70 - 99 mg/dL    Urea Nitrogen 17 7 - 30 mg/dL    Creatinine 0.92 0.66 - 1.25 mg/dL    GFR Estimate >90 >60 mL/min/[1.73_m2]    GFR Estimate If Black >90 >60 mL/min/[1.73_m2]    Calcium 9.1 8.5 - 10.1 mg/dL    Bilirubin Total 0.8 0.2 - 1.3 mg/dL    Albumin 4.5 3.4 - 5.0 g/dL    Protein Total 8.1 6.8 - 8.8 g/dL    Alkaline Phosphatase 90 40 - 150 U/L    ALT 56 0 - 70 U/L    AST 29 0 - 45 U/L   D dimer quantitative   Result Value Ref Range    D Dimer <0.3 0.0 - 0.50 ug/ml FEU   Nt probnp inpatient (BNP)   Result Value Ref Range    N-Terminal Pro BNP Inpatient 13 0 - 900 pg/mL   XR Chest Port 1 View    Narrative    EXAM: XR CHEST PORT 1 VW  LOCATION: [facility]  DATE/TIME: 3/2/2020 8:58 PM    INDICATION: Chest pain and dyspnea  COMPARISON: None.      Impression    IMPRESSION: Negative chest.   EKG 12-lead, tracing only   Result Value Ref Range    Interpretation ECG Click View Image link to view waveform and result           Assessments & Plan (with Medical  Decision Making)   55-year-old male presenting with intermittent chest pain and shortness of breath with an exertional component.   Initial differential diagnosis included a life-threatening cause of chest pain such as a ACS, PE, dissection, pneumonia, pneumothorax, pericarditis ,Boerhaave tear, and other life-threatening as well as nonlife threatening causes of acute chest pain.  On exam initial blood pressure is somewhat elevated, repeat blood pressure now 136/97.  Both his troponin and d-dimer are negative (he is low probability for pulmonary embolism by Wells criteria).  Patient has a HEART score of 4.  Chest x-ray negative.  Remains pain-free in the ED.  Repeat EKG remains normal.  His blood pressure is stable.  No recurrent symptoms at rest.  We will plan on admission to the observation unit for telemetry, serial troponin, and cardiac stress test.  I have discussed with the patient and with the staff at the abs unit.  The patient is stable for transfer.    I have reviewed the nursing notes.    I have reviewed the findings, diagnosis, plan and need for follow up with the patient.    New Prescriptions    No medications on file       Final diagnoses:   Chest pain, unspecified type   I, Lorenzo Joiner, am serving as a trained medical scribe to document services personally performed by Francois Hancock MD, based on the provider's statements to me.      I, Francois Hancock MD, was physically present and have reviewed and verified the accuracy of this note documented by Lorenzo Joiner.     3/2/2020   [facility], [place], EMERGENCY DEPARTMENT     Francois Hancock MD  03/02/20 2390       Francois Hancock MD  03/02/20 [id number]

## 2020-11-14 ENCOUNTER — HEALTH MAINTENANCE LETTER (OUTPATIENT)
Age: 55
End: 2020-11-14

## 2021-09-12 ENCOUNTER — HEALTH MAINTENANCE LETTER (OUTPATIENT)
Age: 56
End: 2021-09-12

## 2022-01-02 ENCOUNTER — HEALTH MAINTENANCE LETTER (OUTPATIENT)
Age: 57
End: 2022-01-02

## 2022-11-19 ENCOUNTER — HEALTH MAINTENANCE LETTER (OUTPATIENT)
Age: 57
End: 2022-11-19

## 2023-04-09 ENCOUNTER — HEALTH MAINTENANCE LETTER (OUTPATIENT)
Age: 58
End: 2023-04-09